# Patient Record
Sex: MALE | Race: OTHER | HISPANIC OR LATINO | URBAN - METROPOLITAN AREA
[De-identification: names, ages, dates, MRNs, and addresses within clinical notes are randomized per-mention and may not be internally consistent; named-entity substitution may affect disease eponyms.]

---

## 2017-07-12 ENCOUNTER — OFFICE (OUTPATIENT)
Dept: URBAN - METROPOLITAN AREA CLINIC 78 | Facility: CLINIC | Age: 57
End: 2017-07-12

## 2017-07-12 VITALS
SYSTOLIC BLOOD PRESSURE: 130 MMHG | WEIGHT: 168 LBS | TEMPERATURE: 97.7 F | HEIGHT: 64 IN | DIASTOLIC BLOOD PRESSURE: 80 MMHG | HEART RATE: 86 BPM

## 2017-07-12 DIAGNOSIS — R19.15 OTHER ABNORMAL BOWEL SOUNDS: ICD-10-CM

## 2017-07-12 DIAGNOSIS — R14.1 GAS PAIN: ICD-10-CM

## 2017-07-12 DIAGNOSIS — R63.4 ABNORMAL WEIGHT LOSS: ICD-10-CM

## 2017-07-12 DIAGNOSIS — K21.9 GASTRO-ESOPHAGEAL REFLUX DISEASE WITHOUT ESOPHAGITIS: ICD-10-CM

## 2017-07-12 PROCEDURE — 99204 OFFICE O/P NEW MOD 45 MIN: CPT

## 2017-07-12 NOTE — SERVICEHPINOTES
KASIE VEE   is a   56   year old    male who complains of gas and GERD. When he eats reports borborgymi. He reports gas with eating. Gas is worse if he eats beans. He has GERD on a daily basis (occurs mainly with orange juice, tomatoes and coffee). Even drinking water causes GERD. In the morning he notes a white coating on this tongue this subsides as the day progresses. He has lost almost #15 he attributes that to working more. Denies n/v, melena, and dysphagia. No family history of stomach or esophageal cancer. He reports having an EGD 2 years ago in [place]-will obtain records.He reports having a stent placed 2 years ago after having cholecystectomy and was told to have it removed in 2 years. He still has the stent in place. Will obtain records from [facility]. He has a BM daily. Stool are BSS type 4-6. No blood present. No family history of colon cancer. Reports having a colonoscopy in [facility] 2 years ago-will obtain records.He reports having DM. He checks his blood sugars periodically, it can range from 200's-450. He stopped following with his PCP Dr. Espinal last year and no longer is taking medication. Advised to make an appointment with PCP now.

## 2017-07-17 ENCOUNTER — AMBULATORY SURGICAL CENTER (OUTPATIENT)
Dept: URBAN - METROPOLITAN AREA SURGERY 21 | Facility: SURGERY | Age: 57
End: 2017-07-17

## 2017-07-17 DIAGNOSIS — K29.60 OTHER GASTRITIS WITHOUT BLEEDING: ICD-10-CM

## 2017-07-17 DIAGNOSIS — K21.9 GASTRO-ESOPHAGEAL REFLUX DISEASE WITHOUT ESOPHAGITIS: ICD-10-CM

## 2017-07-17 PROCEDURE — 43239 EGD BIOPSY SINGLE/MULTIPLE: CPT

## 2017-11-09 ENCOUNTER — OFFICE (OUTPATIENT)
Dept: URBAN - METROPOLITAN AREA CLINIC 78 | Facility: CLINIC | Age: 57
End: 2017-11-09

## 2017-11-09 PROCEDURE — 00014: CPT

## 2018-10-29 ENCOUNTER — OFFICE (OUTPATIENT)
Dept: URBAN - METROPOLITAN AREA CLINIC 78 | Facility: CLINIC | Age: 58
End: 2018-10-29

## 2018-10-29 VITALS
TEMPERATURE: 97.8 F | HEART RATE: 87 BPM | SYSTOLIC BLOOD PRESSURE: 110 MMHG | HEIGHT: 64 IN | WEIGHT: 170 LBS | DIASTOLIC BLOOD PRESSURE: 87 MMHG

## 2018-10-29 DIAGNOSIS — K80.51 CALCULUS OF BILE DUCT WITHOUT CHOLANGITIS OR CHOLECYSTITIS W: ICD-10-CM

## 2018-10-29 PROCEDURE — 99214 OFFICE O/P EST MOD 30 MIN: CPT
